# Patient Record
Sex: MALE | Race: ASIAN | NOT HISPANIC OR LATINO | URBAN - METROPOLITAN AREA
[De-identification: names, ages, dates, MRNs, and addresses within clinical notes are randomized per-mention and may not be internally consistent; named-entity substitution may affect disease eponyms.]

---

## 2023-08-17 ENCOUNTER — OUTPATIENT (OUTPATIENT)
Dept: EMERGENCY DEPT | Facility: HOSPITAL | Age: 88
LOS: 1 days | End: 2023-08-17
Payer: COMMERCIAL

## 2023-08-17 VITALS
OXYGEN SATURATION: 97 % | WEIGHT: 127.87 LBS | SYSTOLIC BLOOD PRESSURE: 152 MMHG | TEMPERATURE: 98 F | RESPIRATION RATE: 17 BRPM | DIASTOLIC BLOOD PRESSURE: 76 MMHG | HEART RATE: 56 BPM | HEIGHT: 69 IN

## 2023-08-17 DIAGNOSIS — R00.1 BRADYCARDIA, UNSPECIFIED: ICD-10-CM

## 2023-08-17 LAB
ALBUMIN SERPL ELPH-MCNC: 4.5 G/DL — SIGNIFICANT CHANGE UP (ref 3.3–5)
ALP SERPL-CCNC: 93 U/L — SIGNIFICANT CHANGE UP (ref 40–120)
ALT FLD-CCNC: 23 U/L — SIGNIFICANT CHANGE UP (ref 10–45)
ANION GAP SERPL CALC-SCNC: 14 MMOL/L — SIGNIFICANT CHANGE UP (ref 5–17)
APTT BLD: 33.2 SEC — SIGNIFICANT CHANGE UP (ref 24.5–35.6)
AST SERPL-CCNC: 29 U/L — SIGNIFICANT CHANGE UP (ref 10–40)
BASOPHILS # BLD AUTO: 0.05 K/UL — SIGNIFICANT CHANGE UP (ref 0–0.2)
BASOPHILS NFR BLD AUTO: 0.7 % — SIGNIFICANT CHANGE UP (ref 0–2)
BILIRUB SERPL-MCNC: 0.7 MG/DL — SIGNIFICANT CHANGE UP (ref 0.2–1.2)
BUN SERPL-MCNC: 31 MG/DL — HIGH (ref 7–23)
CALCIUM SERPL-MCNC: 10 MG/DL — SIGNIFICANT CHANGE UP (ref 8.4–10.5)
CHLORIDE SERPL-SCNC: 97 MMOL/L — SIGNIFICANT CHANGE UP (ref 96–108)
CO2 SERPL-SCNC: 23 MMOL/L — SIGNIFICANT CHANGE UP (ref 22–31)
CREAT SERPL-MCNC: 1.35 MG/DL — HIGH (ref 0.5–1.3)
EGFR: 50 ML/MIN/1.73M2 — LOW
EOSINOPHIL # BLD AUTO: 0.24 K/UL — SIGNIFICANT CHANGE UP (ref 0–0.5)
EOSINOPHIL NFR BLD AUTO: 3.6 % — SIGNIFICANT CHANGE UP (ref 0–6)
GLUCOSE SERPL-MCNC: 290 MG/DL — HIGH (ref 70–99)
HCT VFR BLD CALC: 46.3 % — SIGNIFICANT CHANGE UP (ref 39–50)
HGB BLD-MCNC: 15.2 G/DL — SIGNIFICANT CHANGE UP (ref 13–17)
IMM GRANULOCYTES NFR BLD AUTO: 0.3 % — SIGNIFICANT CHANGE UP (ref 0–0.9)
INR BLD: 1.04 RATIO — SIGNIFICANT CHANGE UP (ref 0.85–1.18)
LYMPHOCYTES # BLD AUTO: 2.23 K/UL — SIGNIFICANT CHANGE UP (ref 1–3.3)
LYMPHOCYTES # BLD AUTO: 33.4 % — SIGNIFICANT CHANGE UP (ref 13–44)
MAGNESIUM SERPL-MCNC: 2.2 MG/DL — SIGNIFICANT CHANGE UP (ref 1.6–2.6)
MCHC RBC-ENTMCNC: 29.7 PG — SIGNIFICANT CHANGE UP (ref 27–34)
MCHC RBC-ENTMCNC: 32.8 GM/DL — SIGNIFICANT CHANGE UP (ref 32–36)
MCV RBC AUTO: 90.6 FL — SIGNIFICANT CHANGE UP (ref 80–100)
MONOCYTES # BLD AUTO: 0.53 K/UL — SIGNIFICANT CHANGE UP (ref 0–0.9)
MONOCYTES NFR BLD AUTO: 7.9 % — SIGNIFICANT CHANGE UP (ref 2–14)
NEUTROPHILS # BLD AUTO: 3.6 K/UL — SIGNIFICANT CHANGE UP (ref 1.8–7.4)
NEUTROPHILS NFR BLD AUTO: 54.1 % — SIGNIFICANT CHANGE UP (ref 43–77)
NRBC # BLD: 0 /100 WBCS — SIGNIFICANT CHANGE UP (ref 0–0)
NT-PROBNP SERPL-SCNC: <36 PG/ML — SIGNIFICANT CHANGE UP (ref 0–300)
PLATELET # BLD AUTO: 114 K/UL — LOW (ref 150–400)
POTASSIUM SERPL-MCNC: 5 MMOL/L — SIGNIFICANT CHANGE UP (ref 3.5–5.3)
POTASSIUM SERPL-SCNC: 5 MMOL/L — SIGNIFICANT CHANGE UP (ref 3.5–5.3)
PROT SERPL-MCNC: 8.4 G/DL — HIGH (ref 6–8.3)
PROTHROM AB SERPL-ACNC: 10.9 SEC — SIGNIFICANT CHANGE UP (ref 9.5–13)
RBC # BLD: 5.11 M/UL — SIGNIFICANT CHANGE UP (ref 4.2–5.8)
RBC # FLD: 13.8 % — SIGNIFICANT CHANGE UP (ref 10.3–14.5)
SODIUM SERPL-SCNC: 134 MMOL/L — LOW (ref 135–145)
TROPONIN T, HIGH SENSITIVITY RESULT: 14 NG/L — SIGNIFICANT CHANGE UP (ref 0–51)
WBC # BLD: 6.67 K/UL — SIGNIFICANT CHANGE UP (ref 3.8–10.5)
WBC # FLD AUTO: 6.67 K/UL — SIGNIFICANT CHANGE UP (ref 3.8–10.5)

## 2023-08-17 NOTE — ED PROVIDER NOTE - NSICDXPASTMEDICALHX_GEN_ALL_CORE_FT
PAST MEDICAL HISTORY:  Benign Essential Hypertension     DU (Duodenal Ulcer)     PUD (Peptic Ulcer Disease)     Type II Diabetes Mellitus

## 2023-08-17 NOTE — ED ADULT NURSE NOTE - NSFALLUNIVINTERV_ED_ALL_ED
Bed/Stretcher in lowest position, wheels locked, appropriate side rails in place/Call bell, personal items and telephone in reach/Instruct patient to call for assistance before getting out of bed/chair/stretcher/Non-slip footwear applied when patient is off stretcher/[place] to call system/Physically safe environment - no spills, clutter or unnecessary equipment/Purposeful proactive rounding/Room/bathroom lighting operational, light cord in reach

## 2023-08-17 NOTE — ED ADULT NURSE NOTE - OBJECTIVE STATEMENT
Patient is a 89 year old male being sent here for admission for pacemaker placement. Patient reports being admitted from 8/14 until today to [facility] for syncope found to have Mobitz type I AV block.  Patient with persistent symptomatic bradycardia while in the hospital with TTE performed.  Case was discussed with cardiology at [facility] who recommended transfer but given delay secondary to diversion family opted to sign patient out AMA and bring to the emergency department themselves. On assessment A&Ox4, breathing comfortably, no accessory muscle use, no cough, chest rise and fall equal, on room air, sinus kaylie on the cardiac monitor, no JVD, no edema noted, abdomen is soft, nondistended, nontender, skin warm. PMH diabetes, hypertension, hyperlipidemia.

## 2023-08-17 NOTE — ED PROVIDER NOTE - RAPID ASSESSMENT
89-year-old male with past medical history of insulin-dependent diabetes, hypertension, hyperlipidemia presents emergency department for admission for pacemaker placement.  Patient was admitted from 8/14 until today to [facility] for syncope found to have Mobitz type I AV block.  Patient with persistent symptomatic bradycardia while in the hospital with TTE performed.  Case was discussed with cardiology at [facility] who recommended transfer but given delay secondary to diversion family opted to sign patient out AMA and bring to the emergency department themselves.  Patient has felt well and denies chest pain, shortness of breath, dizziness.  Per notes provided by patient's daughter Dr. Mehrdad Mcmahon should be aware of patient from cardiology.    Rapid assessment by Rosenda Martini PA-C full eval to be performed in ED

## 2023-08-17 NOTE — ED PROVIDER NOTE - CLINICAL SUMMARY MEDICAL DECISION MAKING FREE TEXT BOX
Transferred from [facility] for admission for placement of cardiac pacemaker. Symptomatic bradycardia. Pt asx and HR normal here, ekg shows heart block. Cards consulted, will admit to medicine on tele.

## 2023-08-17 NOTE — ED ADULT NURSE NOTE - ISOLATION TYPE:
routine visit. On arrival patient laying in bed a&o x4. Patient denied pain. Per patient apetite is good. Voiding without difficulty. Last bm=5/8/22. Wound care completed. Wound still closed. incontinence care administered and brief changed.   No other needs None

## 2023-08-17 NOTE — ED PROVIDER NOTE - ATTENDING CONTRIBUTION TO CARE
Patient is a 89-year-old male with history of type 2 diabetes, hypertension, hyperlipidemia, CKD, dementia here with his daughter-in-law for concern of symptomatic bradycardia.   history is being provided by patient's daughter-in-law.  Accordingly, patient had 2 episodes of syncope while at a restaurant on Monday.  He was taken to [facility].  Per daughter-in-law, he was told he needs a pacemaker.  Patient was to be transferred but family signed out AMA.  Case was discussed with Dr. Mcmahon who should be aware of patient.  At the time of my evaluation, patient denies any chest pain, shortness of breath, palpitations, dizziness.  No recent illnesses.  Denies fevers, chills, nausea, vomiting.    VS noted  Gen. no acute distress, Non toxic   HEENT: EOMI, mmm  Lungs: CTAB/L no C/ W /R   CVS: RRR   Abd; Soft non tender, non distended   Ext: no edema  Skin: no rash  Neuro AAOx3 non focal clear speech  a/p: syncope, hx of bradycardia. EKG with 1st degree AV block. Per paperwork with patient, cardiology was consulted and recommended transfer to [facility] for Micras PPM. NS was on diversion, so patient left AMA and came here directly.   - Jim HAM Patient is a 89-year-old male with history of type 2 diabetes, hypertension, hyperlipidemia, CKD, dementia here with his daughter-in-law for concern of symptomatic bradycardia.   history is being provided by patient's daughter-in-law.  Accordingly, patient had 2 episodes of syncope while at a restaurant on Monday.  He was taken to [facility].  Per daughter-in-law, he was told he needs a pacemaker.  Patient was to be transferred but family signed out AMA.  Case was discussed with Dr. Mcmahon who should be aware of patient.  At the time of my evaluation, patient denies any chest pain, shortness of breath, palpitations, dizziness.  No recent illnesses.  Denies fevers, chills, nausea, vomiting.    VS noted  Gen. no acute distress, Non toxic   HEENT: EOMI, mmm  Lungs: CTAB/L no C/ W /R   CVS: RRR   Abd; Soft non tender, non distended   Ext: no edema  Skin: no rash  Neuro AAOx3 non focal clear speech  a/p: syncope, hx of bradycardia. EKG with 1st degree AV block. Per paperwork with patient, cardiology was consulted and recommended transfer to [facility] for Micras PPM. NS was on diversion, so patient left AMA and came here directly. Will consult with cardiology and admit patient to tele.   Roger Fernandez MD

## 2023-08-17 NOTE — ED ADULT NURSE NOTE - CHIEF COMPLAINT QUOTE
pt was seen at [facility] and was found to have low heart rate and told he needed pacemaker  D/C from [facility] and came here for pacemaker  pt denies cp, sob, lightheadedness/weakness

## 2023-08-17 NOTE — CONSULT NOTE ADULT - ATTENDING COMMENTS
This patient has both AV conduction issues as well as vasovagal syncope and will be better served with a dual pacemaker instead of a Micra since he will need good AV synchrony and atrial Ventricular sequential pacing

## 2023-08-17 NOTE — CONSULT NOTE ADULT - ASSESSMENT
89M with PMH of DM, HTN, HLD presenting as a transfer from [facility] for syncope and concern for symptomatic bradycardia. No evidence for structural heart disease on TTE, 1st degree AV block on EKG (MA interval 330). DDx for syncope includes delayed AV conduction (supra-hisian) vs carotid hypersensitivity. Currently asymptomatic with HR in 60s, sinus rhythm.     Plan:  -monitor on telemetry overnight  -check TSH, no AV richard blockade  -NPO at [place] for potential PPM placement    Note not final until signed by attending

## 2023-08-17 NOTE — CONSULT NOTE ADULT - SUBJECTIVE AND OBJECTIVE BOX
Ted Tran MD  Cardiology Fellow  [phone]  All Cardiology service information can be found 24/7 on [email or web address], password: [email or web address]    Patient seen and evaluated at bedside    Chief Complaint: syncope    HPI:  89M with PMH of DM, HTN, HLD presenting as a transfer from [facility] for syncope and concern for symptomatic bradycardia. Patient initially presented on 8/14 to [place] due to a syncopal episode that occurred while eating, patient passed out in his chair, and was brought to the hospital. Prior to this, patient had another syncopal episode 2 months ago for which he did not seek medical care. At [place], TTE was obtained which reportedly showed EF 70%, mild diastolic dysfunction. Per family, patient was asymptomatic while in the hospital, and had heart rates 40s to 60s. Per discharge paperwork, patient was also noted to be in Mobitz type I.     Patient currently asymptomatic, [place] speaking, history obtained with  and from family. ROS positive for some chills but otherwise negative, no chest pain.     Cardiac Meds: amlodipine 5, atorvastatin 20, valsartan 40    PMHx:   Type II Diabetes Mellitus    Benign Essential Hypertension    PUD (Peptic Ulcer Disease)    DU (Duodenal Ulcer)    Allergies:  No Known Allergies    REVIEW OF SYSTEMS:  CONSTITUTIONAL: No weakness, fevers or chills  EYES/ENT: No visual changes;  No dysphagia  NECK: No pain or stiffness  RESPIRATORY: No cough, wheezing, hemoptysis; No shortness of breath  CARDIOVASCULAR: No chest pain or palpitations; No lower extremity edema  GASTROINTESTINAL: No abdominal or epigastric pain. No nausea, vomiting, or hematemesis; No diarrhea or constipation. No melena or hematochezia.  BACK: No back pain  GENITOURINARY: No dysuria, frequency or hematuria  NEUROLOGICAL: No numbness or weakness  SKIN: No itching, burning, rashes, or lesions   All other review of systems is negative unless indicated above.    Physical Exam:  T(F): 98.2 (08-17), Max: 98.2 (08-17)  HR: 63 (08-17) (56 - 63)  BP: 156/72 (08-17) (152/76 - 156/72)  RR: 18 (08-17)  SpO2: 99% (08-17)  GENERAL: No acute distress, well-developed  HEAD:  Atraumatic, Normocephalic  ENT: EOMI, conjunctiva and sclera clear, Neck supple, No JVD, moist mucosa  CHEST/LUNG: Clear to auscultation bilaterally; No wheeze, equal breath sounds bilaterally   HEART: Regular rate and rhythm; No murmurs, rubs, or gallops  ABDOMEN: Soft, Nontender, Nondistended  EXTREMITIES:  No clubbing, cyanosis, or edema  PSYCH: Nl behavior, nl affect  NEUROLOGY: AAOx3, non-focal, cranial nerves intact  SKIN: Normal color, No rashes or lesions    CXR: Personally reviewed    Labs: Personally reviewed                        15.2   6.67  )-----------( 114      ( 17 Aug 2023 18:35 )             46.3     08-17    134<L>  |  97  |  31<H>  ----------------------------<  290<H>  5.0   |  23  |  1.35<H>    Ca    10.0      17 Aug 2023 18:35  Mg     2.2     08-17    TPro  8.4<H>  /  Alb  4.5  /  TBili  0.7  /  DBili  x   /  AST  29  /  ALT  23  /  AlkPhos  93  08-17    PT/INR - ( 17 Aug 2023 18:35 )   PT: 10.9 sec;   INR: 1.04 ratio         PTT - ( 17 Aug 2023 18:35 )  PTT:33.2 sec    CARDIAC MARKERS ( 17 Aug 2023 18:35 )  14 ng/L / x     / x     / x     / x     / x

## 2023-08-17 NOTE — ED PROVIDER NOTE - OBJECTIVE STATEMENT
89M pmh HTN, HLD, T2DM, dementia presents from [facility] for admission for pacemaker placement. Pt was admitted to [facility] from 8/14 until today for symptomatic bradycardia. He had 2 episodes of syncope at a restaurant on monday that resolved within a few minutes and prompted activation of EMS by family. He was worked up at OSH and found to need a pacemaker placement for which he was transferred to [facility]. Per notes provided by patient's daughter Dr. Mehrdad Mcmahon from EP should be aware of this patient. Patient has felt well here, HR 50ws-60s and denies chest pain, shortness of breath, dizziness. History obtained from daughter in law at bedside.    PCP: Varghese Mata [phone] 89M pmh HTN, HLD, T2DM, CKD, dementia presents from [facility] for admission for pacemaker placement. Pt was admitted to [facility] from 8/14 until today for symptomatic bradycardia. He had 2 episodes of syncope at a restaurant on monday that resolved within a few minutes and prompted activation of EMS by family. He was worked up at OSH and found to need a pacemaker placement for which he was transferred to [facility]. Per notes provided by patient's daughter Dr. Mehrdad Mcmahon from EP should be aware of this patient. Patient has felt well here, HR 50ws-60s and denies chest pain, shortness of breath, dizziness. History obtained from daughter in law at bedside.    PCP: Varghese Mata [phone]

## 2023-08-17 NOTE — ED ADULT TRIAGE NOTE - CHIEF COMPLAINT QUOTE
pt was seen at [facility] and was found to have low heart rate and told he needed pacemaker  D/C from [facility] and came here for pacemaker  pt denies cp, sob, lightheadedness/weakness

## 2023-08-17 NOTE — ED PROVIDER NOTE - CARE PLAN
1 Principal Discharge DX:	Bradycardia  Assessment and plan of treatment:	Transferred from [facility] for admission for placement of cardiac pacemaker. Symptomatic bradycardia. Cards consulted, will admit to medicine on tele.

## 2023-08-17 NOTE — ED PROVIDER NOTE - PLAN OF CARE
Transferred from [facility] for admission for placement of cardiac pacemaker. Symptomatic bradycardia. Cards consulted, will admit to medicine on tele.

## 2023-08-18 ENCOUNTER — TRANSCRIPTION ENCOUNTER (OUTPATIENT)
Age: 88
End: 2023-08-18

## 2023-08-18 VITALS
OXYGEN SATURATION: 95 % | HEART RATE: 75 BPM | RESPIRATION RATE: 17 BRPM | TEMPERATURE: 97 F | DIASTOLIC BLOOD PRESSURE: 88 MMHG | SYSTOLIC BLOOD PRESSURE: 139 MMHG

## 2023-08-18 DIAGNOSIS — E11.9 TYPE 2 DIABETES MELLITUS WITHOUT COMPLICATIONS: ICD-10-CM

## 2023-08-18 DIAGNOSIS — E78.5 HYPERLIPIDEMIA, UNSPECIFIED: ICD-10-CM

## 2023-08-18 DIAGNOSIS — R55 SYNCOPE AND COLLAPSE: ICD-10-CM

## 2023-08-18 DIAGNOSIS — I44.30 UNSPECIFIED ATRIOVENTRICULAR BLOCK: ICD-10-CM

## 2023-08-18 DIAGNOSIS — I10 ESSENTIAL (PRIMARY) HYPERTENSION: ICD-10-CM

## 2023-08-18 DIAGNOSIS — I44.1 ATRIOVENTRICULAR BLOCK, SECOND DEGREE: ICD-10-CM

## 2023-08-18 DIAGNOSIS — Z29.9 ENCOUNTER FOR PROPHYLACTIC MEASURES, UNSPECIFIED: ICD-10-CM

## 2023-08-18 LAB
A1C WITH ESTIMATED AVERAGE GLUCOSE RESULT: 9.5 % — HIGH (ref 4–5.6)
ALBUMIN SERPL ELPH-MCNC: 3.7 G/DL — SIGNIFICANT CHANGE UP (ref 3.3–5)
ALP SERPL-CCNC: 79 U/L — SIGNIFICANT CHANGE UP (ref 40–120)
ALT FLD-CCNC: 18 U/L — SIGNIFICANT CHANGE UP (ref 10–45)
ANION GAP SERPL CALC-SCNC: 14 MMOL/L — SIGNIFICANT CHANGE UP (ref 5–17)
AST SERPL-CCNC: 23 U/L — SIGNIFICANT CHANGE UP (ref 10–40)
BILIRUB SERPL-MCNC: 0.6 MG/DL — SIGNIFICANT CHANGE UP (ref 0.2–1.2)
BUN SERPL-MCNC: 26 MG/DL — HIGH (ref 7–23)
CALCIUM SERPL-MCNC: 9 MG/DL — SIGNIFICANT CHANGE UP (ref 8.4–10.5)
CHLORIDE SERPL-SCNC: 100 MMOL/L — SIGNIFICANT CHANGE UP (ref 96–108)
CO2 SERPL-SCNC: 21 MMOL/L — LOW (ref 22–31)
CREAT SERPL-MCNC: 1.17 MG/DL — SIGNIFICANT CHANGE UP (ref 0.5–1.3)
EGFR: 59 ML/MIN/1.73M2 — LOW
ESTIMATED AVERAGE GLUCOSE: 226 MG/DL — HIGH (ref 68–114)
GLUCOSE BLDC GLUCOMTR-MCNC: 218 MG/DL — HIGH (ref 70–99)
GLUCOSE BLDC GLUCOMTR-MCNC: 249 MG/DL — HIGH (ref 70–99)
GLUCOSE BLDC GLUCOMTR-MCNC: 254 MG/DL — HIGH (ref 70–99)
GLUCOSE BLDC GLUCOMTR-MCNC: 258 MG/DL — HIGH (ref 70–99)
GLUCOSE SERPL-MCNC: 330 MG/DL — HIGH (ref 70–99)
HCT VFR BLD CALC: 41 % — SIGNIFICANT CHANGE UP (ref 39–50)
HGB BLD-MCNC: 13.7 G/DL — SIGNIFICANT CHANGE UP (ref 13–17)
MAGNESIUM SERPL-MCNC: 2.1 MG/DL — SIGNIFICANT CHANGE UP (ref 1.6–2.6)
MCHC RBC-ENTMCNC: 30.1 PG — SIGNIFICANT CHANGE UP (ref 27–34)
MCHC RBC-ENTMCNC: 33.4 GM/DL — SIGNIFICANT CHANGE UP (ref 32–36)
MCV RBC AUTO: 90.1 FL — SIGNIFICANT CHANGE UP (ref 80–100)
NRBC # BLD: 0 /100 WBCS — SIGNIFICANT CHANGE UP (ref 0–0)
PHOSPHATE SERPL-MCNC: 3.4 MG/DL — SIGNIFICANT CHANGE UP (ref 2.5–4.5)
PLATELET # BLD AUTO: 92 K/UL — LOW (ref 150–400)
POTASSIUM SERPL-MCNC: 4.6 MMOL/L — SIGNIFICANT CHANGE UP (ref 3.5–5.3)
POTASSIUM SERPL-SCNC: 4.6 MMOL/L — SIGNIFICANT CHANGE UP (ref 3.5–5.3)
PROT SERPL-MCNC: 7.1 G/DL — SIGNIFICANT CHANGE UP (ref 6–8.3)
RBC # BLD: 4.55 M/UL — SIGNIFICANT CHANGE UP (ref 4.2–5.8)
RBC # FLD: 13.5 % — SIGNIFICANT CHANGE UP (ref 10.3–14.5)
SODIUM SERPL-SCNC: 135 MMOL/L — SIGNIFICANT CHANGE UP (ref 135–145)
T4 FREE SERPL-MCNC: 1.4 NG/DL — SIGNIFICANT CHANGE UP (ref 0.9–1.8)
TSH SERPL-MCNC: 4.35 UIU/ML — HIGH (ref 0.27–4.2)
WBC # BLD: 5.43 K/UL — SIGNIFICANT CHANGE UP (ref 3.8–10.5)
WBC # FLD AUTO: 5.43 K/UL — SIGNIFICANT CHANGE UP (ref 3.8–10.5)

## 2023-08-18 RX ORDER — ATORVASTATIN CALCIUM 80 MG/1
20 TABLET, FILM COATED ORAL AT BEDTIME
Refills: 0 | Status: DISCONTINUED | OUTPATIENT
Start: 2023-08-18 | End: 2023-08-18

## 2023-08-18 RX ORDER — GLUCAGON INJECTION, SOLUTION 0.5 MG/.1ML
1 INJECTION, SOLUTION SUBCUTANEOUS ONCE
Refills: 0 | Status: DISCONTINUED | OUTPATIENT
Start: 2023-08-18 | End: 2023-08-18

## 2023-08-18 RX ORDER — HEPARIN SODIUM 5000 [USP'U]/ML
5000 INJECTION INTRAVENOUS; SUBCUTANEOUS EVERY 8 HOURS
Refills: 0 | Status: DISCONTINUED | OUTPATIENT
Start: 2023-08-18 | End: 2023-08-18

## 2023-08-18 RX ORDER — DEXTROSE 50 % IN WATER 50 %
25 SYRINGE (ML) INTRAVENOUS ONCE
Refills: 0 | Status: DISCONTINUED | OUTPATIENT
Start: 2023-08-18 | End: 2023-08-18

## 2023-08-18 RX ORDER — METOPROLOL TARTRATE 50 MG
1 TABLET ORAL
Qty: 30 | Refills: 0
Start: 2023-08-18 | End: 2023-09-16

## 2023-08-18 RX ORDER — ACETAMINOPHEN 500 MG
650 TABLET ORAL EVERY 6 HOURS
Refills: 0 | Status: DISCONTINUED | OUTPATIENT
Start: 2023-08-18 | End: 2023-08-18

## 2023-08-18 RX ORDER — INSULIN LISPRO 100/ML
VIAL (ML) SUBCUTANEOUS AT BEDTIME
Refills: 0 | Status: DISCONTINUED | OUTPATIENT
Start: 2023-08-18 | End: 2023-08-18

## 2023-08-18 RX ORDER — INSULIN LISPRO 100/ML
VIAL (ML) SUBCUTANEOUS
Refills: 0 | Status: DISCONTINUED | OUTPATIENT
Start: 2023-08-18 | End: 2023-08-18

## 2023-08-18 RX ORDER — AMLODIPINE BESYLATE 2.5 MG/1
5 TABLET ORAL DAILY
Refills: 0 | Status: DISCONTINUED | OUTPATIENT
Start: 2023-08-18 | End: 2023-08-18

## 2023-08-18 RX ORDER — INSULIN GLARGINE 100 [IU]/ML
20 INJECTION, SOLUTION SUBCUTANEOUS AT BEDTIME
Refills: 0 | Status: DISCONTINUED | OUTPATIENT
Start: 2023-08-18 | End: 2023-08-18

## 2023-08-18 RX ORDER — DEXTROSE 50 % IN WATER 50 %
15 SYRINGE (ML) INTRAVENOUS ONCE
Refills: 0 | Status: DISCONTINUED | OUTPATIENT
Start: 2023-08-18 | End: 2023-08-18

## 2023-08-18 RX ORDER — SODIUM CHLORIDE 9 MG/ML
500 INJECTION, SOLUTION INTRAVENOUS
Refills: 0 | Status: DISCONTINUED | OUTPATIENT
Start: 2023-08-18 | End: 2023-08-18

## 2023-08-18 RX ORDER — HUMAN INSULIN 100 [IU]/ML
8 INJECTION, SUSPENSION SUBCUTANEOUS ONCE
Refills: 0 | Status: DISCONTINUED | OUTPATIENT
Start: 2023-08-18 | End: 2023-08-18

## 2023-08-18 RX ORDER — INSULIN GLARGINE 100 [IU]/ML
20 INJECTION, SOLUTION SUBCUTANEOUS EVERY MORNING
Refills: 0 | Status: DISCONTINUED | OUTPATIENT
Start: 2023-08-18 | End: 2023-08-18

## 2023-08-18 RX ORDER — INSULIN LISPRO 100/ML
VIAL (ML) SUBCUTANEOUS EVERY 6 HOURS
Refills: 0 | Status: DISCONTINUED | OUTPATIENT
Start: 2023-08-18 | End: 2023-08-18

## 2023-08-18 RX ORDER — SODIUM CHLORIDE 9 MG/ML
1000 INJECTION, SOLUTION INTRAVENOUS
Refills: 0 | Status: DISCONTINUED | OUTPATIENT
Start: 2023-08-18 | End: 2023-08-18

## 2023-08-18 RX ORDER — DEXTROSE 50 % IN WATER 50 %
12.5 SYRINGE (ML) INTRAVENOUS ONCE
Refills: 0 | Status: DISCONTINUED | OUTPATIENT
Start: 2023-08-18 | End: 2023-08-18

## 2023-08-18 RX ORDER — METOPROLOL TARTRATE 50 MG
50 TABLET ORAL DAILY
Refills: 0 | Status: DISCONTINUED | OUTPATIENT
Start: 2023-08-18 | End: 2023-08-18

## 2023-08-18 RX ORDER — LANOLIN ALCOHOL/MO/W.PET/CERES
3 CREAM (GRAM) TOPICAL AT BEDTIME
Refills: 0 | Status: DISCONTINUED | OUTPATIENT
Start: 2023-08-18 | End: 2023-08-18

## 2023-08-18 RX ORDER — ONDANSETRON 8 MG/1
4 TABLET, FILM COATED ORAL EVERY 8 HOURS
Refills: 0 | Status: DISCONTINUED | OUTPATIENT
Start: 2023-08-18 | End: 2023-08-18

## 2023-08-18 RX ADMIN — AMLODIPINE BESYLATE 5 MILLIGRAM(S): 2.5 TABLET ORAL at 05:23

## 2023-08-18 RX ADMIN — Medication 50 MILLIGRAM(S): at 16:45

## 2023-08-18 RX ADMIN — Medication 3: at 07:58

## 2023-08-18 RX ADMIN — Medication 2: at 14:41

## 2023-08-18 RX ADMIN — Medication 2: at 16:08

## 2023-08-18 RX ADMIN — SODIUM CHLORIDE 50 MILLILITER(S): 9 INJECTION, SOLUTION INTRAVENOUS at 05:23

## 2023-08-18 RX ADMIN — HEPARIN SODIUM 5000 UNIT(S): 5000 INJECTION INTRAVENOUS; SUBCUTANEOUS at 05:23

## 2023-08-18 NOTE — PROGRESS NOTE ADULT - NS ATTEND AMEND GEN_ALL_CORE FT
This patient has evidence of AV conduction and also vasovagal syncope. With two episodes of syncope, a dual pacer with rate drop would be ideal. He cannot be treated with a beta blocker without a pacemaker. He needs atrial pacing. LVEF was normal Patient and family in agreement

## 2023-08-18 NOTE — H&P ADULT - PROBLEM SELECTOR PLAN 6
DVT prophy: SCD given possible procedure  Diet: NPO for procedure DVT prophy: heparin 5000U TID  Diet: NPO for possible procedure, pending EP recs

## 2023-08-18 NOTE — H&P ADULT - ASSESSMENT
89 yo M with history of syncope, heart block, insulin dependent DM2, HTN, HLD presenting after syncope and from [facility] for potential pacemaker given Mobitz type 1 heart block seen on EKG there.  89 M with insulin dependent DM2, HTN, HLD, presenting after hospitalization from [place] due to presyncopal episode and possible Mobitz type 1 heart block, found to have 1st degree heart block on EKG here, currently asymptomatic.   89 M with insulin dependent DM2, HTN, HLD, recent hospitalization @ OSH for presyncopal episode and possible Mobitz type 1 heart block, found to have mild bradycardia and 1st degree heart block on EKG here, currently asymptomatic.

## 2023-08-18 NOTE — DISCHARGE NOTE NURSING/CASE MANAGEMENT/SOCIAL WORK - NSDCPEFALRISK_GEN_ALL_CORE
For information on Fall & Injury Prevention, visit: [email or web address] OR  [email or web address] OR  https://www.cdc.gov/steadi/patient.html

## 2023-08-18 NOTE — PROGRESS NOTE ADULT - PROBLEM SELECTOR PLAN 4
Patient's Cr is 1.35, unclear if this is his baseline.   -hold home valsartan 40mg for now given mild creatinine elevation  -continue home amlodipine 5mg  - Trend BUN/Cr

## 2023-08-18 NOTE — PROGRESS NOTE ADULT - PROBLEM SELECTOR PLAN 2
kaylie to 50s. Briefly to HR 40s overnight.  Found to have questionable Wenkebach at [place], but no EKG to confirm. Here, seen to have first degree AV block, EKG in 2011 also with first degree AV block. On tele pt was in 50s and above, briefly in 40s while sleeping. Not on any AV richard blockers.   - EP following, appreciate recs: potential dual chamber PPM placement 8/18  - tele monitoring as above kaylie to 50s. Briefly to HR 40s overnight.  Found to have questionable Wenkebach at [place], but no EKG to confirm. Here, seen to have first degree AV block, EKG in 2011 also with first degree AV block. On tele pt was in 50s and above, briefly in 40s while sleeping. Not on any AV richard blockers.   - EP following, appreciate recs: dual chamber PPM placement 8/18  - tele monitoring as above

## 2023-08-18 NOTE — PRE-OP CHECKLIST - RESPIRATORY RATE (BREATHS/MIN)
Patient states thinks last time urinated between 2000 and 2130 last night, states woke up at about midnight and was unable to urinate. Patient states having pressure from not being able to urinate. Painful to palpation and distended bladder. Bladder scanned at 502.    18

## 2023-08-18 NOTE — DISCHARGE NOTE PROVIDER - CARE PROVIDER_API CALL
Wound Check at [facility] with ACP,   [address] [id number]  Phone: [phone]  Fax: (   )    -  Scheduled Appointment: 09/06/2023 03:40 PM   Wound Check at [facility] with ACP,   [address] [place]  [address]  Phone: [phone]  Fax: (   )    -  Scheduled Appointment: 09/06/2023 03:40 PM    [facility] at [place],   [facility] [place] [address]  Phone: [phone]  Fax: (   )    -  Follow Up Time:

## 2023-08-18 NOTE — PROVIDER CONTACT NOTE (OTHER) - ASSESSMENT
Pt is AxOx4, all VSS, no pain or SOB, no dizziness, pt asymptomatic.
Pt is AxOx4, all other VSS, /84, HR now 55. No chest pain, no dizziness or SOB. Pt admitted for bradycardia, pending PPM today

## 2023-08-18 NOTE — PROVIDER CONTACT NOTE (OTHER) - REASON
HR dropped to 43 on cardiac monitor, notified by tele tech
2.2 second pause HR 39 on cardiac monitor

## 2023-08-18 NOTE — PROGRESS NOTE ADULT - PROBLEM SELECTOR PLAN 3
Insulin dependent. Discharged from [facility] with 25U lantus, which is his home regimen. Previously also took novolog 20 at home, but was d/c'd at [facility]. Does not take meal time insulin.   - 20 U lantus ---> remains hyperglycemic 8/18; switched to 8 NPH 8/18  - KASIA Insulin dependent. Discharged from [facility] with 25U lantus, which is his home regimen. Previously also took novolog 20 at home, but was d/c'd at [facility]. Does not take meal time insulin.   - c/w 20 U lantus   - KASIA

## 2023-08-18 NOTE — DISCHARGE NOTE PROVIDER - PROVIDER TOKENS
FREE:[LAST:[Wound Check at [facility] with ACP],PHONE:[phone]],FAX:[(   )    -],ADDRESS:[[address] [place] [address]],SCHEDULEDAPPT:[09/06/2023],SCHEDULEDAPPTTIME:[03:40 PM]] FREE:[LAST:[Wound Check at [facility] with ACP],PHONE:[phone]],FAX:[(   )    -],ADDRESS:[[address] [place] [address]],SCHEDULEDAPPT:[09/06/2023],SCHEDULEDAPPTTIME:[03:40 PM]],FREE:[LAST:[[facility] at [place]],PHONE:[phone]],FAX:[(   )    -],ADDRESS:[[address] [place] [id number]]]

## 2023-08-18 NOTE — PROGRESS NOTE ADULT - ASSESSMENT
89 y.o. Male with PMH of DM, HTN, HLD presenting s/p recent hospitalization @ [facility] for dizzy and lightheaded episodes, no LOC.  On EKG/ tele noted to have NSR/ Sinus Bradycardia with 1st degree AV BLock, left anterior verito block rates 40's- 50's. Patient admits to ALBRIGHT.    Currently asymptomatic at rest in 1st degree heart block.      1.  Near Syncope with NSR/ Sinus Bradycardia with 1st degree AV Block, left anterior verito block rates 40's- 50's  also noted to have pauses <2.5 seconds on tele    - NPO for dual chamber pacemaker today  - follow up TSH, no AV richard blockade for now  - type and screen x 2 stat  - Hibiclens wash now  - Discontinued SQ Heparin for procedure.  Plan to avoid Heparin/ Lovenox post pacemaker  - Above case and plan discussed with patient's family member via Dr. Salome Marquez [facility]  [phone]  89 y.o. Male with PMH of DM, HTN, HLD presenting s/p recent hospitalization @ [facility] for dizzy and lightheaded episodes, no LOC.  On EKG/ tele noted to have NSR/ Sinus Bradycardia with 1st degree AV BLock, left anterior verito block rates 40's- 50's. Patient admits to ALBRIGHT.    Currently asymptomatic at rest in 1st degree heart block.  Patient has medical records at bedside from [facility] with reported TTE done during hospitalization with LVEF 70% and diastolic dysfunction.     1.  Near Syncope with NSR/ Sinus Bradycardia with 1st degree AV Block, left anterior verito block rates 40's- 50's  also noted to have pauses <2.5 seconds on tele    - NPO for dual chamber pacemaker today  - follow up TSH, no AV richard blockade for now  - type and screen x 2 stat  - Hibiclens wash now  - Discontinued SQ Heparin for procedure.  Plan to avoid Heparin/ Lovenox post pacemaker  - Above case and plan discussed with patient's family member via Dr. Salome Marquez [facility]  [phone]  89 y.o. Male with PMH of DM, HTN, HLD presenting s/p recent hospitalization @ [facility] for dizzy and lightheaded episodes, no LOC.  On EKG/ tele noted to have NSR/ Sinus Bradycardia with 1st degree AV BLock, left anterior verito block rates 40's- 50's. Patient admits to ALBRIGHT.    Currently asymptomatic at rest in 1st degree heart block.  Patient has medical records at bedside from [facility] with reported TTE done during hospitalization with LVEF 70% and diastolic dysfunction.     1.  Near Syncope with NSR/ Sinus Bradycardia with 1st degree AV Block, left anterior verito block rates 40's- 50's  also noted to have pauses <2.5 seconds on tele    - NPO for dual chamber pacemaker today  - follow up TSH, no AV richard blockade for now  - type and screen x 2 stat  - Hibiclens wash now  - post pacemaker teaching, activity and restrictions initiated via  phone  - Discontinued SQ Heparin for procedure.  Plan to avoid Heparin/ Lovenox post pacemaker  - Above case and plan discussed with patient's family member via Dr. Salome Marquez [facility]  [phone]     Addendum- patient is s/p dual chamber Medtronic PPM today. Tolerated procedure well. Post procedure activity and restrictions reviewed with patient's family daughter and wife at bedside. Booklet and remote monitor provided. Card will be mailed by [employer]. S/P device interrogation and pairing by [employer] Rep.  Follow up in EP Clinic on 9/6/23 at 3:40pm.     89 y.o. Male with PMH of DM, HTN, HLD presenting s/p recent hospitalization @ [facility] for dizzy and lightheaded episodes, no LOC.  On EKG/ tele noted to have NSR/ Sinus Bradycardia with 1st degree AV BLock, left anterior verito block rates 40's- 50's. Patient admits to ALBRIGHT.    Currently asymptomatic at rest in 1st degree heart block.  Patient has medical records at bedside from [facility] with reported TTE done during hospitalization with LVEF 70% and diastolic dysfunction.     1.  Near Syncope with NSR/ Sinus Bradycardia with 1st degree AV Block, left anterior verito block rates 40's- 50's  also noted to have pauses <2.5 seconds on tele    - NPO for dual chamber pacemaker today  - follow up TSH, no AV richard blockade for now  - type and screen x 2 stat  - Hibiclens wash now  - post pacemaker teaching, activity and restrictions initiated via  phone  - Discontinued SQ Heparin for procedure.  Plan to avoid Heparin/ Lovenox post pacemaker  - Above case and plan discussed with patient's family member via Dr. Salome Marquez [facility]  [phone]     Addendum- patient is s/p dual chamber Medtronic PPM today. Tolerated procedure well. Post procedure activity and restrictions reviewed with patient's family daughter and wife at bedside. Booklet and remote monitor provided. Card will be mailed by [employer]. S/P device interrogation and pairing by [employer] Rep.  Follow up in EP Clinic on 9/6/23 at 3:40pm.  CXray with good lead placement and no pneumothorax.

## 2023-08-18 NOTE — PROGRESS NOTE ADULT - ATTENDING COMMENTS
89M PMH IDDM2, HTN, HLD, with recent admission to [place] for syncope, now presents to [facility] for PPM after leaving that hospital AMA. Syncopal episode occurred after consuming GI herbal remedy which is known to be a stimulant patient reports this was a first time taking this medication for him.    #Syncope  - EP consulted and following, for possible PPM, follow up recommendations  - Echocardiogram ordered  - TSH, B12 ordered

## 2023-08-18 NOTE — PROGRESS NOTE ADULT - SUBJECTIVE AND OBJECTIVE BOX
24H hour events:     MEDICATIONS:  amLODIPine   Tablet 5 milliGRAM(s) Oral daily    acetaminophen     Tablet .. 650 milliGRAM(s) Oral every 6 hours PRN  melatonin 3 milliGRAM(s) Oral at bedtime PRN  ondansetron Injectable 4 milliGRAM(s) IV Push every 8 hours PRN    aluminum hydroxide/magnesium hydroxide/simethicone Suspension 30 milliLiter(s) Oral every 4 hours PRN    atorvastatin 20 milliGRAM(s) Oral at bedtime  dextrose 50% Injectable 25 Gram(s) IV Push once  dextrose 50% Injectable 12.5 Gram(s) IV Push once  dextrose 50% Injectable 25 Gram(s) IV Push once  dextrose Oral Gel 15 Gram(s) Oral once PRN  glucagon  Injectable 1 milliGRAM(s) IntraMuscular once  insulin glargine Injectable (LANTUS) 20 Unit(s) SubCutaneous every morning  insulin lispro (ADMELOG) corrective regimen sliding scale   SubCutaneous every 6 hours  insulin lispro (ADMELOG) corrective regimen sliding scale   SubCutaneous at bedtime    dextrose 5%. 1000 milliLiter(s) IV Continuous <Continuous>  dextrose 5%. 1000 milliLiter(s) IV Continuous <Continuous>  lactated ringers. 500 milliLiter(s) IV Continuous <Continuous>      REVIEW OF SYSTEMS:  Complete 12point ROS negative.    PHYSICAL EXAM:  T(C): 36.5 (08-18-23 @ 04:28), Max: 36.8 (08-17-23 @ 20:12)  HR: 69 (08-18-23 @ 04:28) (55 - 69)  BP: 122/74 (08-18-23 @ 04:28) (122/74 - 156/72)  RR: 18 (08-18-23 @ 04:28) (17 - 18)  SpO2: 97% (08-18-23 @ 04:28) (97% - 99%)  Wt(kg): --  I&O's Summary      Appearance: Normal	  HEENT:   Normal oral mucosa, PERRL, EOMI	  Cardiovascular: Normal S1 S2, No JVD, No murmurs, No edema  Respiratory: Lungs clear to auscultation	  Psychiatry: A & O x 3, Mood & affect appropriate  Gastrointestinal:  Soft, Non-tender, + BS	  Skin: No rashes, No ecchymoses, No cyanosis	  Neurologic: Non-focal  Extremities: Normal range of motion, No clubbing, cyanosis or edema  Vascular: Peripheral pulses palpable 2+ bilaterally        LABS:	 	    CBC Full  -  ( 18 Aug 2023 07:19 )  WBC Count : 5.43 K/uL  Hemoglobin : 13.7 g/dL  Hematocrit : 41.0 %  Platelet Count - Automated : 92 K/uL  Mean Cell Volume : 90.1 fl  Mean Cell Hemoglobin : 30.1 pg  Mean Cell Hemoglobin Concentration : 33.4 gm/dL  Auto Neutrophil # : x  Auto Lymphocyte # : x  Auto Monocyte # : x  Auto Eosinophil # : x  Auto Basophil # : x  Auto Neutrophil % : x  Auto Lymphocyte % : x  Auto Monocyte % : x  Auto Eosinophil % : x  Auto Basophil % : x    08-18    135  |  100  |  26<H>  ----------------------------<  330<H>  4.6   |  21<L>  |  1.17  08-17    134<L>  |  97  |  31<H>  ----------------------------<  290<H>  5.0   |  23  |  1.35<H>    Ca    9.0      18 Aug 2023 07:19  Ca    10.0      17 Aug 2023 18:35  Phos  3.4     08-18  Mg     2.1     08-18  Mg     2.2     08-17    TPro  7.1  /  Alb  3.7  /  TBili  0.6  /  DBili  x   /  AST  23  /  ALT  18  /  AlkPhos  79  08-18  TPro  8.4<H>  /  Alb  4.5  /  TBili  0.7  /  DBili  x   /  AST  29  /  ALT  23  /  AlkPhos  93  08-17      TSH: Thyroid Stimulating Hormone, Serum: 4.35 uIU/mL (08-18 @ 07:19)        TELEMETRY: 	          24H hour events: No over night events. Denies c/o CP, palpitations or SOB. Admits to [facility].     [facility] : [id number]    MEDICATIONS:  amLODIPine   Tablet 5 milliGRAM(s) Oral daily    acetaminophen     Tablet .. 650 milliGRAM(s) Oral every 6 hours PRN  melatonin 3 milliGRAM(s) Oral at bedtime PRN  ondansetron Injectable 4 milliGRAM(s) IV Push every 8 hours PRN    aluminum hydroxide/magnesium hydroxide/simethicone Suspension 30 milliLiter(s) Oral every 4 hours PRN    atorvastatin 20 milliGRAM(s) Oral at bedtime  dextrose 50% Injectable 25 Gram(s) IV Push once  dextrose 50% Injectable 12.5 Gram(s) IV Push once  dextrose 50% Injectable 25 Gram(s) IV Push once  dextrose Oral Gel 15 Gram(s) Oral once PRN  glucagon  Injectable 1 milliGRAM(s) IntraMuscular once  insulin glargine Injectable (LANTUS) 20 Unit(s) SubCutaneous every morning  insulin lispro (ADMELOG) corrective regimen sliding scale   SubCutaneous every 6 hours  insulin lispro (ADMELOG) corrective regimen sliding scale   SubCutaneous at bedtime    dextrose 5%. 1000 milliLiter(s) IV Continuous <Continuous>  dextrose 5%. 1000 milliLiter(s) IV Continuous <Continuous>  lactated ringers. 500 milliLiter(s) IV Continuous <Continuous>      REVIEW OF SYSTEMS:  Complete 12point ROS negative.    PHYSICAL EXAM:  T(C): 36.5 (08-18-23 @ 04:28), Max: 36.8 (08-17-23 @ 20:12)  HR: 69 (08-18-23 @ 04:28) (55 - 69)  BP: 122/74 (08-18-23 @ 04:28) (122/74 - 156/72)  RR: 18 (08-18-23 @ 04:28) (17 - 18)  SpO2: 97% (08-18-23 @ 04:28) (97% - 99%)      Appearance: Normal	  HEENT:   Normal oral mucosa, PERRL, EOMI	  Cardiovascular: Normal S1 S2, No JVD, No murmurs, No edema  Respiratory: Lungs clear to auscultation	  Psychiatry: A & O x 3, Mood & affect appropriate  Gastrointestinal:  Soft, Non-tender, + BS	  Skin: No rashes, No ecchymoses, No cyanosis	  Neurologic: Non-focal  Extremities: Normal range of motion, No clubbing, cyanosis or edema  Vascular: Peripheral pulses palpable 2+ bilaterally        LABS:	 	    CBC Full  -  ( 18 Aug 2023 07:19 )  WBC Count : 5.43 K/uL  Hemoglobin : 13.7 g/dL  Hematocrit : 41.0 %  Platelet Count - Automated : 92 K/uL  Mean Cell Volume : 90.1 fl  Mean Cell Hemoglobin : 30.1 pg  Mean Cell Hemoglobin Concentration : 33.4 gm/dL  Auto Neutrophil # : x  Auto Lymphocyte # : x  Auto Monocyte # : x  Auto Eosinophil # : x  Auto Basophil # : x  Auto Neutrophil % : x  Auto Lymphocyte % : x  Auto Monocyte % : x  Auto Eosinophil % : x  Auto Basophil % : x    08-18    135  |  100  |  26<H>  ----------------------------<  330<H>  4.6   |  21<L>  |  1.17  08-17    134<L>  |  97  |  31<H>  ----------------------------<  290<H>  5.0   |  23  |  1.35<H>    Ca    9.0      18 Aug 2023 07:19  Ca    10.0      17 Aug 2023 18:35  Phos  3.4     08-18  Mg     2.1     08-18  Mg     2.2     08-17    TPro  7.1  /  Alb  3.7  /  TBili  0.6  /  DBili  x   /  AST  23  /  ALT  18  /  AlkPhos  79  08-18  TPro  8.4<H>  /  Alb  4.5  /  TBili  0.7  /  DBili  x   /  AST  29  /  ALT  23  /  AlkPhos  93  08-17      TSH: Thyroid Stimulating Hormone, Serum: 4.35 uIU/mL (08-18 @ 07:19)        TELEMETRY: 	  Sinus Bradycardia/ NSR 40-60's 1st degree with pauses <2.5 seconds

## 2023-08-18 NOTE — H&P ADULT - HISTORY OF PRESENT ILLNESS
89 M with Mobitz type I AV block, insulin dependent DM2, HTN, HLD, CKD, dementia  presenting as a transfer from [place] for syncope and possible pacemaker placement. On 8/14 patient had 2 episodes of syncope at a restaurant resolved within a few minutes and was admitted at [place] and found to have Mobitz type I AV block.  Currently, patient *** 89 M with Mobitz type I AV block, insulin dependent DM2, HTN, HLD, CKD, dementia  presenting as a transfer from [place] for syncope and possible pacemaker placement. On 8/14 patient states he felt dizzy and lightheaded at a restaurant that resolved within a few minutes and his son and daughter in law took him to [place]. There he was found to have symptomatic bradycardia as well as a Mobitz type I AV block. He also underwent a TTE there which showed LV EF of 70% and diastolic dysfunction with normal mean LAP. Patient was planned for transfer to [facility] for PPM placement, however given ***, left AMA and was taken by his son to [facility].  Patient states he has not had chest pain or palpitations during this time period and that he has never felt dizzy or lightheaded in this way prior to the incident at the restaurant. He states that he has no chest pain now and is not SOB. EKG from 2011 with type I AV block.  89 M with Mobitz type I AV block, insulin dependent DM2, HTN, HLD, CKD, dementia  presenting as a transfer from [place] for syncope and possible pacemaker placement. On 8/14 patient states he felt dizzy and lightheaded at a restaurant that resolved within a few minutes and his son and daughter in law took him to [place]. There he was found to have symptomatic bradycardia as well as a Mobitz type I AV block. He also underwent a TTE there which showed LV EF of 70% and diastolic dysfunction with normal mean LAP. Patient was planned for transfer to [facility] for PPM placement, however given delay secondary to diversion, left AMA and was taken by his son to [facility].  Patient states he has not had chest pain or palpitations during this time period and that he has never felt dizzy or lightheaded in this way prior to the incident at the restaurant. He states that he has no chest pain now and is not SOB. EKG from 2011 with type I AV block.     In ED< patient had another EKG which showed type I AV block. Cardiology was consulted.  89 M with insulin dependent DM2, HTN, HLD, presenting after hospitalization from [place] due to presyncopal episode and possible Mobitz type 1 heart block. On 8/14 patient states he felt dizzy and lightheaded at a restaurant that resolved within a few minutes and his son and daughter in law took him to [place]. There he was found to have symptomatic bradycardia and was reported to have Mobitz type I AV block on an EKG, unable to confirm currently as we do not have the EKG. He also underwent a TTE there which showed LV EF of 70% and diastolic dysfunction with normal mean LAP. Patient was planned for transfer to [facility] for possible PPM placement, however given delay secondary to diversion, left AMA and was taken by his son to [facility].  Patient states he has not had chest pain or palpitations during this time period and that he has never felt dizzy or lightheaded in this way prior to the incident at the restaurant. He states that he has no chest pain now and is not SOB. Previous EKG from 2011 was seen with first degree AV block.     In ED, patient had another EKG which showed first degree AV block. EP was consulted.  89 M with insulin dependent DM2, HTN, HLD, presenting after hospitalization from [place] due to presyncopal episode and possible Mobitz type 1 heart block. On 8/14 patient states he felt dizzy and lightheaded at a restaurant that resolved within a few minutes and his son and daughter in law took him to [facility]. Patient denies loss of consciousness during this event. There he was found to have symptomatic bradycardia and was reported to have Mobitz type I AV block on an EKG, unable to confirm currently as we do not have the EKG. He also underwent a TTE there which showed LV EF of 70% and diastolic dysfunction with normal mean LAP. Patient was planned for transfer to [facility] for possible PPM placement, however given delay secondary to diversion, left AMA and was taken by his son to [facility].  Patient states he has not had chest pain or palpitations during this time period and that he has never felt dizzy or lightheaded in this way prior to the incident at the restaurant. He states that he has no chest pain now and is not SOB. Previous EKG from 2011 was seen with first degree AV block.     In ED, patient had another EKG which showed first degree AV block. EP was consulted.  Pt evaluated prior to midnight on 8/18/23.    89 M with insulin dependent DM2, HTN, HLD, presenting after hospitalization from [place] due to presyncopal episode and possible Mobitz type 1 heart block. On 8/14 patient states he felt dizzy and lightheaded at a restaurant that resolved within a few minutes and his son and daughter in law took him to [place]. Patient denies loss of consciousness during this event. There he was found to have symptomatic bradycardia and was reported to have Mobitz type I AV block on an EKG, unable to confirm currently as we do not have the EKG. He also underwent a TTE there which showed LV EF of 70% and diastolic dysfunction. Patient was planned for transfer to [facility] for possible PPM placement, however given delay secondary to diversion, left AMA and was taken by his son to [facility].  Patient states he has not had chest pain or palpitations during this time period and that he has never felt dizzy or lightheaded in this way prior to the incident at the restaurant. He states that he has no chest pain now and is not SOB. Previous EKG from 2011 was seen with first degree AV block.     In ED, patient had another EKG which showed first degree AV block. EP was consulted.

## 2023-08-18 NOTE — PROGRESS NOTE ADULT - SUBJECTIVE AND OBJECTIVE BOX
Patient is a 89y old  Male who presents with a chief complaint of presyncope (18 Aug 2023 08:30)    SUBJECTIVE / OVERNIGHT EVENTS: Patient chart reviewed. No overnight events.     MEDICATIONS  (STANDING):  amLODIPine   Tablet 5 milliGRAM(s) Oral daily  atorvastatin 20 milliGRAM(s) Oral at bedtime  dextrose 5%. 1000 milliLiter(s) (50 mL/Hr) IV Continuous <Continuous>  dextrose 5%. 1000 milliLiter(s) (100 mL/Hr) IV Continuous <Continuous>  dextrose 50% Injectable 25 Gram(s) IV Push once  dextrose 50% Injectable 12.5 Gram(s) IV Push once  dextrose 50% Injectable 25 Gram(s) IV Push once  glucagon  Injectable 1 milliGRAM(s) IntraMuscular once  insulin glargine Injectable (LANTUS) 20 Unit(s) SubCutaneous at bedtime  insulin lispro (ADMELOG) corrective regimen sliding scale   SubCutaneous every 6 hours  insulin lispro (ADMELOG) corrective regimen sliding scale   SubCutaneous at bedtime  insulin NPH human recombinant 8 Unit(s) SubCutaneous once  lactated ringers. 500 milliLiter(s) (50 mL/Hr) IV Continuous <Continuous>    MEDICATIONS  (PRN):  acetaminophen     Tablet .. 650 milliGRAM(s) Oral every 6 hours PRN Temp greater or equal to 38C (100.4F), Mild Pain (1 - 3)  aluminum hydroxide/magnesium hydroxide/simethicone Suspension 30 milliLiter(s) Oral every 4 hours PRN Dyspepsia  dextrose Oral Gel 15 Gram(s) Oral once PRN Blood Glucose LESS THAN 70 milliGRAM(s)/deciliter  melatonin 3 milliGRAM(s) Oral at bedtime PRN Insomnia  ondansetron Injectable 4 milliGRAM(s) IV Push every 8 hours PRN Nausea and/or Vomiting      CAPILLARY BLOOD GLUCOSE      POCT Blood Glucose.: 254 mg/dL (18 Aug 2023 10:44)  POCT Blood Glucose.: 258 mg/dL (18 Aug 2023 07:51)    I&O's Summary      Vital Signs Last 24 Hrs  T(C): 36.6 (18 Aug 2023 10:31), Max: 36.8 (17 Aug 2023 20:12)  T(F): 97.9 (18 Aug 2023 10:31), Max: 98.2 (17 Aug 2023 20:12)  HR: 69 (18 Aug 2023 04:28) (55 - 69)  BP: 149/75 (18 Aug 2023 10:31) (122/74 - 156/72)  BP(mean): --  RR: 18 (18 Aug 2023 10:31) (17 - 18)  SpO2: 95% (18 Aug 2023 10:31) (95% - 99%)    Parameters below as of 18 Aug 2023 04:28  Patient On (Oxygen Delivery Method): room air        PHYSICAL EXAM:  GENERAL: NAD, well-developed, well-nourished   HEAD: Atraumatic, Normocephalic  EYES: EOMI, PERRLA, conjunctiva and sclera clear  NECK: Supple, No JVD  CHEST/LUNG: Clear to auscultation bilaterally; No wheezes or crackles  HEART: Normal S1/S2; Regular rate and rhythm; No murmurs, rubs, or gallops  ABDOMEN: Soft, Nontender, Nondistended; Bowel sounds present  EXTREMITIES: 2+ Peripheral Pulses; No clubbing, cyanosis, or edema  PSYCH: A&Ox3  NEUROLOGY: no focal neurologic deficit  SKIN: No rashes or lesions    LABS:                        13.7   5.43  )-----------( 92       ( 18 Aug 2023 07:19 )             41.0      08-18    135  |  100  |  26<H>  ----------------------------<  330<H>  4.6   |  21<L>  |  1.17    Ca    9.0      18 Aug 2023 07:19  Phos  3.4     08-18  Mg     2.1     08-18    TPro  7.1  /  Alb  3.7  /  TBili  0.6  /  DBili  x   /  AST  23  /  ALT  18  /  AlkPhos  79  08-18    PT/INR - ( 17 Aug 2023 18:35 )   PT: 10.9 sec;   INR: 1.04 ratio         PTT - ( 17 Aug 2023 18:35 )  PTT:33.2 sec      Urinalysis Basic - ( 18 Aug 2023 07:19 )    Color: x / Appearance: x / SG: x / pH: x  Gluc: 330 mg/dL / Ketone: x  / Bili: x / Urobili: x   Blood: x / Protein: x / Nitrite: x   Leuk Esterase: x / RBC: x / WBC x   Sq Epi: x / Non Sq Epi: x / Bacteria: x        RADIOLOGY & ADDITIONAL TESTS:    Imaging Personally Reviewed:    Consultant(s) Notes Reviewed:      Care Discussed with Consultants/Other Providers:   Patient is a 89y old  Male who presents with a chief complaint of presyncope (18 Aug 2023 08:30)    SUBJECTIVE / OVERNIGHT EVENTS: Elli Ward ID [id number]. Patient was examined at bedside this morning. Appeared comfortable. Overnight vitals and monitoring results were reviewed. Reporting no complaints. Denied chest pain, SOB, abdominal pain, vomiting, diarrhea, constipation. Signed HIP[employer] medical form release 8/18.    MEDICATIONS  (STANDING):  amLODIPine   Tablet 5 milliGRAM(s) Oral daily  atorvastatin 20 milliGRAM(s) Oral at bedtime  dextrose 5%. 1000 milliLiter(s) (50 mL/Hr) IV Continuous <Continuous>  dextrose 5%. 1000 milliLiter(s) (100 mL/Hr) IV Continuous <Continuous>  dextrose 50% Injectable 25 Gram(s) IV Push once  dextrose 50% Injectable 12.5 Gram(s) IV Push once  dextrose 50% Injectable 25 Gram(s) IV Push once  glucagon  Injectable 1 milliGRAM(s) IntraMuscular once  insulin glargine Injectable (LANTUS) 20 Unit(s) SubCutaneous at bedtime  insulin lispro (ADMELOG) corrective regimen sliding scale   SubCutaneous every 6 hours  insulin lispro (ADMELOG) corrective regimen sliding scale   SubCutaneous at bedtime  insulin NPH human recombinant 8 Unit(s) SubCutaneous once  lactated ringers. 500 milliLiter(s) (50 mL/Hr) IV Continuous <Continuous>    MEDICATIONS  (PRN):  acetaminophen     Tablet .. 650 milliGRAM(s) Oral every 6 hours PRN Temp greater or equal to 38C (100.4F), Mild Pain (1 - 3)  aluminum hydroxide/magnesium hydroxide/simethicone Suspension 30 milliLiter(s) Oral every 4 hours PRN Dyspepsia  dextrose Oral Gel 15 Gram(s) Oral once PRN Blood Glucose LESS THAN 70 milliGRAM(s)/deciliter  melatonin 3 milliGRAM(s) Oral at bedtime PRN Insomnia  ondansetron Injectable 4 milliGRAM(s) IV Push every 8 hours PRN Nausea and/or Vomiting      CAPILLARY BLOOD GLUCOSE      POCT Blood Glucose.: 254 mg/dL (18 Aug 2023 10:44)  POCT Blood Glucose.: 258 mg/dL (18 Aug 2023 07:51)    I&O's Summary      Vital Signs Last 24 Hrs  T(C): 36.6 (18 Aug 2023 10:31), Max: 36.8 (17 Aug 2023 20:12)  T(F): 97.9 (18 Aug 2023 10:31), Max: 98.2 (17 Aug 2023 20:12)  HR: 69 (18 Aug 2023 04:28) (55 - 69)  BP: 149/75 (18 Aug 2023 10:31) (122/74 - 156/72)  BP(mean): --  RR: 18 (18 Aug 2023 10:31) (17 - 18)  SpO2: 95% (18 Aug 2023 10:31) (95% - 99%)    Parameters below as of 18 Aug 2023 04:28  Patient On (Oxygen Delivery Method): room air        PHYSICAL EXAM:  GENERAL: NAD, well-developed, well-nourished   HEAD: Atraumatic, Normocephalic  EYES: EOMI, PERRLA, conjunctiva and sclera clear  NECK: Supple, No JVD  CHEST/LUNG: Clear to auscultation bilaterally; No wheezes or crackles  HEART: Normal S1/S2; Regular rate and rhythm; No murmurs, rubs, or gallops  ABDOMEN: Soft, Nontender, Nondistended; Bowel sounds present  EXTREMITIES: 2+ Peripheral Pulses; No clubbing, cyanosis, or edema  PSYCH: A&Ox3  NEUROLOGY: no focal neurologic deficit  SKIN: No rashes or lesions    LABS:                        13.7   5.43  )-----------( 92       ( 18 Aug 2023 07:19 )             41.0      08-18    135  |  100  |  26<H>  ----------------------------<  330<H>  4.6   |  21<L>  |  1.17    Ca    9.0      18 Aug 2023 07:19  Phos  3.4     08-18  Mg     2.1     08-18    TPro  7.1  /  Alb  3.7  /  TBili  0.6  /  DBili  x   /  AST  23  /  ALT  18  /  AlkPhos  79  08-18    PT/INR - ( 17 Aug 2023 18:35 )   PT: 10.9 sec;   INR: 1.04 ratio         PTT - ( 17 Aug 2023 18:35 )  PTT:33.2 sec      Urinalysis Basic - ( 18 Aug 2023 07:19 )    Color: x / Appearance: x / SG: x / pH: x  Gluc: 330 mg/dL / Ketone: x  / Bili: x / Urobili: x   Blood: x / Protein: x / Nitrite: x   Leuk Esterase: x / RBC: x / WBC x   Sq Epi: x / Non Sq Epi: x / Bacteria: x        RADIOLOGY & ADDITIONAL TESTS:    Imaging Personally Reviewed:    Consultant(s) Notes Reviewed:      Care Discussed with Consultants/Other Providers:

## 2023-08-18 NOTE — PROVIDER CONTACT NOTE (OTHER) - BACKGROUND
Pt admitted with bradycardia, hx of Type 2 DM, PUD.
Pt is admitted with bradycardia. Pt has a history of HTN. Admitted with Mobitz type 1 Heart block

## 2023-08-18 NOTE — DISCHARGE NOTE PROVIDER - NSDCFUSCHEDAPPT_GEN_ALL_CORE_FT
[facility]  ELECTROPH [address]  Scheduled Appointment: 09/06/2023

## 2023-08-18 NOTE — DISCHARGE NOTE PROVIDER - NSDCCPCAREPLAN_GEN_ALL_CORE_FT
PRINCIPAL DISCHARGE DIAGNOSIS  Diagnosis: Pre-syncope  Assessment and Plan of Treatment: You were admitted to the hospital [facility] you were dizzy. Heart testing was done and ti showed an arrythmia we think caused the dizziness. The electrophysiologist heart doctors assessed you and recommended you have a pacemaker placed which was done 8/18. When the dizziness episode happened you were eating betel nut. We recommend you do not continue to eat this as it increases risk for head and neck cancers and can cause problems with your teeth among other health risks.   Please follow up with your primary care doctor.   If you start to experience chest pain, shortness of breath, abdominal pain, nausea, or vomiting please return to the emergency room.       PRINCIPAL DISCHARGE DIAGNOSIS  Diagnosis: Pre-syncope  Assessment and Plan of Treatment: You were admitted to the hospital [facility] you were dizzy. Heart testing was done and ti showed an arrythmia we think caused the dizziness. The electrophysiologist heart doctors assessed you and recommended you have a pacemaker placed which was done 8/18. You are also being started on a new medication metoprolol 50mg once a day; please take as directed.   When the dizziness episode happened you were eating betel nut. We recommend you do not continue to eat this as it increases risk for head and neck cancers and can cause problems with your teeth among other health risks.   Please follow up with your primary care doctor.   If you start to experience chest pain, shortness of breath, abdominal pain, nausea, or vomiting please return to the emergency room.

## 2023-08-18 NOTE — H&P ADULT - NSHPLABSRESULTS_GEN_ALL_CORE
LABS:                         15.2   6.67  )-----------( 114      ( 17 Aug 2023 18:35 )             46.3     08-17    134<L>  |  97  |  31<H>  ----------------------------<  290<H>  5.0   |  23  |  1.35<H>    Ca    10.0      17 Aug 2023 18:35  Mg     2.2     08-17    TPro  8.4<H>  /  Alb  4.5  /  TBili  0.7  /  DBili  x   /  AST  29  /  ALT  23  /  AlkPhos  93  08-17    PT/INR - ( 17 Aug 2023 18:35 )   PT: 10.9 sec;   INR: 1.04 ratio         PTT - ( 17 Aug 2023 18:35 )  PTT:33.2 sec  Urinalysis Basic - ( 17 Aug 2023 18:35 )    Color: x / Appearance: x / SG: x / pH: x  Gluc: 290 mg/dL / Ketone: x  / Bili: x / Urobili: x   Blood: x / Protein: x / Nitrite: x   Leuk Esterase: x / RBC: x / WBC x   Sq Epi: x / Non Sq Epi: x / Bacteria: x            RADIOLOGY, EKG & ADDITIONAL TESTS: Reviewed.     EKG reviewed by me: prolonged MN interval, type 1 AV block, regular rate. no ST elevations    CXR prelim: clear lungs Personally reviewed imaging, labs, ekg.    EKG reviewed by me: prolonged NV interval, type 1 AV block, regular rate. no ST elevations    CXR prelim: clear lungs    LABS:                         15.2   6.67  )-----------( 114      ( 17 Aug 2023 18:35 )             46.3     08-17    134<L>  |  97  |  31<H>  ----------------------------<  290<H>  5.0   |  23  |  1.35<H>    Ca    10.0      17 Aug 2023 18:35  Mg     2.2     08-17    TPro  8.4<H>  /  Alb  4.5  /  TBili  0.7  /  DBili  x   /  AST  29  /  ALT  23  /  AlkPhos  93  08-17    PT/INR - ( 17 Aug 2023 18:35 )   PT: 10.9 sec;   INR: 1.04 ratio         PTT - ( 17 Aug 2023 18:35 )  PTT:33.2 sec  Urinalysis Basic - ( 17 Aug 2023 18:35 )    Color: x / Appearance: x / SG: x / pH: x  Gluc: 290 mg/dL / Ketone: x  / Bili: x / Urobili: x   Blood: x / Protein: x / Nitrite: x   Leuk Esterase: x / RBC: x / WBC x   Sq Epi: x / Non Sq Epi: x / Bacteria: x            RADIOLOGY, EKG & ADDITIONAL TESTS: Reviewed.

## 2023-08-18 NOTE — H&P ADULT - ATTENDING COMMENTS
Pt was seen and examined during key portion of E/M service. Case discussed with resident. H&P reviewed and edited where appropriate. Other than the following, I agree with the above history, exam, assessment, and plan.  89M with insulin dependent DM2, HTN, HLD, recent hospitalization @ OSH for presyncopal episode and possible Mobitz type 1 heart block, found to have mild bradycardia and 1st degree heart block on EKG here, currently asymptomatic.    - f/u EP recs. tele. currently NPO for poss PPM. orthostats. trend Cr.

## 2023-08-18 NOTE — DISCHARGE NOTE PROVIDER - NSFOLLOWUPCLINICS_GEN_ALL_ED_FT
Cardiology at [facility]  Cardiology  [address] [place] [address]  Phone: [phone]  Fax:

## 2023-08-18 NOTE — H&P ADULT - PROBLEM SELECTOR PLAN 1
Patient with two episodes of syncope, found to have Mobitz 1 heart block at [place]. EKG at [facility] with 1st degree AV block.    - cardiology following, appreciate recs  - tele monitoring  - TSH  - potential PPM placement by cardiology Patient with two episodes of syncope, found to have Mobitz 1 heart block at [place]. EKG at [facility] with 1st degree AV block.  Would like to rule out other causes of syncope, including orthostatic hypotension. No valvular disease commented from TTE on records from [place].   - cardiology following, appreciate recs  - tele monitoring  - obtain TTE report from [place]  - TSH  - B12  - folate  - orthostatic vitals  - potential PPM placement by cardiology Patient with one episode of pre-syncope leading to hospitalization at [place]. Presyncope may have been due to symptomatic bradycardia at the time, other causes include dehydration, vasovagal episode, separate arrythmia, or less likely infection. No valvular disease commented from TTE on records from [place].  - EP following, appreciate recs  - Tele monitoring  - obtain TTE & EKG report from [place]  - TSH  - B12  - folate  - orthostatic vitals  - PT eval Patient with one episode of pre-syncope leading to hospitalization at [place]. Presyncope may have been due to symptomatic bradycardia at the time, other causes include dehydration, vasovagal episode, separate arrythmia, or less likely infection. No valvular disease commented from TTE on records from [place].  - EP following, appreciate recs  - Tele monitoring  - obtain TTE & EKG report from [place]  - TSH  - B12  - folate  - orthostatic vitals  - hydration with 500cc LR at 50cc/hr  - PT eval

## 2023-08-18 NOTE — PROGRESS NOTE ADULT - ASSESSMENT
89 M with insulin dependent DM2, HTN, HLD, recent hospitalization @ OSH for presyncopal episode and possible Mobitz type 1 heart block, found to have mild bradycardia and 1st degree heart block on EKG here, currently asymptomatic. Awaiting potential PPM placement.

## 2023-08-18 NOTE — CHART NOTE - NSCHARTNOTEFT_GEN_A_CORE
Pt s/p pacemaker placement ([employer]) [id number] via LCW with DSD and tegaderm intact with no hematoma bleeding       Plan:  Recovery on tele until 6-7pm  portable CXR done Dr. Mcmahon at bedside read Xray when completed with no PTX and Lead tip placement in correct position  started Metoprolol succ 50 qd  okay to d/c home 6-7pm as per EP    f/u appointment for wound check on 9/6/23 at 3:40pm  TTE d/c'd as d/w with Dr. Vizcarra.  Device interrogated and home monitoring device paired and explained to pt.  Discussed above with NAOMI LeCNP-BC  Cardiology

## 2023-08-18 NOTE — DISCHARGE NOTE NURSING/CASE MANAGEMENT/SOCIAL WORK - PATIENT PORTAL LINK FT
You can access the FollowMyHealth Patient Portal offered by [facility] by registering at the following website: [email or web address]. By joining [employer]’s FollowMyHealth portal, you will also be able to view your health information using other applications (apps) compatible with our system.

## 2023-08-18 NOTE — H&P ADULT - NSHPPHYSICALEXAM_GEN_ALL_CORE
Gen: well appearing, in no acute distress  Head: Normocephalic, atraumatic  ENT: MMM  CV: RRR, no m/r/g appreciated  Pulm: CTAB, no accessory muscle use  Abd: Nontender, non distended, normoactive bowel sounds  Extremities: no pitting edema in BLE  Neuro: CN II-XII grossly intact  Psych: Aox3, appropriate mood and affect T(C): 36.5 (08-17-23 @ 22:42), Max: 36.8 (08-17-23 @ 20:12)  T(F): 97.7 (08-17-23 @ 22:42), Max: 98.2 (08-17-23 @ 20:12)  HR: 55 (08-18-23 @ 03:10) (55 - 67)  BP: 150/84 (08-18-23 @ 03:10) (150/84 - 156/72)  RR: 18 (08-18-23 @ 03:10) (17 - 18)  SpO2: 98% (08-18-23 @ 03:10) (97% - 99%)      Gen: well appearing, in no acute distress  Head: Normocephalic, atraumatic  ENT: MMM  CV: RRR, no m/r/g appreciated  Pulm: CTAB, no accessory muscle use  Abd: Nontender, non distended, normoactive bowel sounds  Extremities: no pitting edema in BLE  Neuro: CN II-XII grossly intact  Psych: Aox3, appropriate mood and affect

## 2023-08-18 NOTE — PATIENT PROFILE ADULT - FALL HARM RISK - HARM RISK INTERVENTIONS

## 2023-08-18 NOTE — PROGRESS NOTE ADULT - PROBLEM SELECTOR PLAN 1
Patient with one episode of pre-syncope leading to hospitalization at [place]. Presyncope may have been due to symptomatic bradycardia at the time, other causes include dehydration, vasovagal episode, separate arrythmia, or less likely infection. No valvular disease commented from TTE on records from [place].  - EP following, appreciate recs  - Tele monitoring  - obtain TTE & EKG report from [place]: sent HIPPA release form 8/18  - TSH 8/18: 4.35 wnl  - B12  - folate  - orthostatic vitals  - hydration with 500cc LR at 50cc/hr  - PT eval Patient with one episode of pre-syncope leading to hospitalization at [place]. Presyncope may have been due to symptomatic bradycardia at the time, other causes include dehydration, vasovagal episode, separate arrythmia, or less likely infection. No valvular disease commented from TTE on records from [place].  - EP following, appreciate recs  - Tele monitoring  - obtain TTE & EKG report from [place]: sent HIPPA release form 8/18  - TSH 8/18: 4.35 wnl  - B12  - folate  - orthostatic vitals  - hydration with 500cc LR at 50cc/hr  - PT eval  - of note pt was trying betel nut (stimulant) for the first time during dizziness episode

## 2023-08-18 NOTE — H&P ADULT - PROBLEM SELECTOR PLAN 4
Continue home valsartan 40mg. Patient's Cr is 1.35, unclear if this is his baseline.   -hold home valsartan 40mg for now given creatinine Patient's Cr is 1.35, unclear if this is his baseline.   -hold home valsartan 40mg for now given creatinine  -continue home amlodipine 5mg Patient's Cr is 1.35, unclear if this is his baseline.   -hold home valsartan 40mg for now given mild creatinine elevation  -continue home amlodipine 5mg

## 2023-08-18 NOTE — PROVIDER CONTACT NOTE (OTHER) - SITUATION
HR dropped to 43 on cardiac monitor, notified by tele tech
2.2 second pause HR 39 on cardiac monitor, notified by tele tech

## 2023-08-18 NOTE — H&P ADULT - PROBLEM SELECTOR PLAN 3
Insulin dependent. Takes *** at home. Insulin dependent. Discharged from [place] with 25U lantus. Previously also took novolog 20 at home, but was d/c'd at [place]. Does not take meal time insulin.   - Continue 25 U lantus   - KASIA Insulin dependent. Discharged from [place] with 25U lantus, which is his home regimen. Previously also took novolog 20 at home, but was d/c'd at [place]. Does not take meal time insulin.   - 20 U lantus   - KASIA

## 2023-08-18 NOTE — DISCHARGE NOTE PROVIDER - HOSPITAL COURSE
89 M with insulin dependent DM2, HTN, HLD, recent hospitalization @ OSH for presyncopal episode and possible Mobitz type 1 heart block, found to have mild bradycardia and 1st degree heart block on EKG here. EP c/s and rec'd PPM placement done 8/18.     Patient is now hemodynamically stable and medically optimized for discharge home with referrals to appropriate clinics. 89 M with insulin dependent DM2, HTN, HLD, recent hospitalization @ OSH for presyncopal episode and possible Mobitz type 1 heart block, found to have mild bradycardia and 1st degree heart block on EKG here. EP c/s and rec'd PPM placement done 8/18. Of note pt was using betel nut for the first time during pre-syncopal episode; counselled to stop using.     Patient is now hemodynamically stable and medically optimized for discharge home with referrals to appropriate clinics. 89 M with insulin dependent DM2, HTN, HLD, recent hospitalization @ OSH for presyncopal episode and possible Mobitz type 1 heart block, found to have mild bradycardia and 1st degree heart block on EKG here. EP c/s and rec'd PPM placement done 8/18. Pt also dc'd on metoprolol succinated 50mg daily.     Of note pt was using betel nut for the first time during pre-syncopal episode; counselled to stop using.     Patient is now hemodynamically stable and medically optimized for discharge home with referrals to appropriate clinics.

## 2023-08-18 NOTE — H&P ADULT - PROBLEM SELECTOR PLAN 2
Found to have Mobitz 1 heart block at [place], transferred for pacemaker placement.   - cardiology following appreciate recs  - tele monitoring as above  - potential PPM placement by cardiology as above. Found to have Mobitz 1 heart block at [place], transferred for pacemaker placement. Here, seen to have type I AV block  - cardiology following appreciate recs  - tele monitoring as above  - potential PPM placement by cardiology as above. Found to have questionable Wenkebach at [place], but no EKG to confirm. Here, seen to have first degree AV block, EKG in 2011 also with first degree AV block. On tele pt was in 50s and above, briefly in 40s while sleeping. Not on any AV richard blockers.   - f/u EP recs   - tele monitoring as above kaylie to 50s. Briefly to HR 40s overnight.  Found to have questionable Wenkebach at [place], but no EKG to confirm. Here, seen to have first degree AV block, EKG in 2011 also with first degree AV block. On tele pt was in 50s and above, briefly in 40s while sleeping. Not on any AV richard blockers.   - f/u EP recs   - tele monitoring as above

## 2023-08-18 NOTE — DISCHARGE NOTE PROVIDER - NSDCMRMEDTOKEN_GEN_ALL_CORE_FT
amLODIPine 5 mg oral tablet: 1 orally once a day  atorvastatin 20 mg oral tablet: 1 orally once a day  Lantus 100 units/mL subcutaneous solution: 25 unit(s) subcutaneous once a day (at bedtime)  valsartan 40 mg oral tablet: 1 orally once a day   amLODIPine 5 mg oral tablet: 1 orally once a day  atorvastatin 20 mg oral tablet: 1 orally once a day  Lantus 100 units/mL subcutaneous solution: 25 unit(s) subcutaneous once a day (at bedtime)  metoprolol succinate 50 mg oral tablet, extended release: 1 tab(s) orally once a day  valsartan 40 mg oral tablet: 1 orally once a day

## 2023-09-06 ENCOUNTER — RESULT CHARGE (OUTPATIENT)
Age: 88
End: 2023-09-06

## 2023-09-07 ENCOUNTER — NON-APPOINTMENT (OUTPATIENT)
Age: 88
End: 2023-09-07

## 2023-09-07 ENCOUNTER — APPOINTMENT (OUTPATIENT)
Dept: ELECTROPHYSIOLOGY | Facility: CLINIC | Age: 88
End: 2023-09-07
Payer: MEDICARE

## 2023-09-07 VITALS — OXYGEN SATURATION: 98 % | SYSTOLIC BLOOD PRESSURE: 135 MMHG | HEART RATE: 77 BPM | DIASTOLIC BLOOD PRESSURE: 73 MMHG

## 2023-09-07 DIAGNOSIS — I44.0 ATRIOVENTRICULAR BLOCK, FIRST DEGREE: ICD-10-CM

## 2023-09-07 DIAGNOSIS — I44.1 ATRIOVENTRICULAR BLOCK, SECOND DEGREE: ICD-10-CM

## 2023-09-07 DIAGNOSIS — R55 SYNCOPE AND COLLAPSE: ICD-10-CM

## 2023-09-07 PROCEDURE — 99024 POSTOP FOLLOW-UP VISIT: CPT

## 2023-09-07 PROCEDURE — 93000 ELECTROCARDIOGRAM COMPLETE: CPT | Mod: 59

## 2023-09-07 PROCEDURE — 93280 PM DEVICE PROGR EVAL DUAL: CPT

## 2023-09-07 RX ORDER — METOPROLOL SUCCINATE 50 MG/1
50 TABLET, EXTENDED RELEASE ORAL DAILY
Qty: 90 | Refills: 1 | Status: ACTIVE | COMMUNITY
Start: 2023-09-07 | End: 1900-01-01

## 2023-09-08 PROCEDURE — 33208 INSRT HEART PM ATRIAL & VENT: CPT

## 2023-09-08 RX ORDER — AMLODIPINE BESYLATE 2.5 MG/1
1 TABLET ORAL
Refills: 0 | DISCHARGE

## 2023-09-08 RX ORDER — INSULIN GLARGINE 100 [IU]/ML
25 INJECTION, SOLUTION SUBCUTANEOUS
Refills: 0 | DISCHARGE

## 2023-09-08 RX ORDER — VALSARTAN 80 MG/1
1 TABLET ORAL
Refills: 0 | DISCHARGE

## 2023-09-08 RX ORDER — ATORVASTATIN CALCIUM 80 MG/1
1 TABLET, FILM COATED ORAL
Refills: 0 | DISCHARGE

## 2023-12-08 ENCOUNTER — APPOINTMENT (OUTPATIENT)
Dept: ELECTROPHYSIOLOGY | Facility: CLINIC | Age: 88
End: 2023-12-08

## 2024-01-16 ENCOUNTER — APPOINTMENT (OUTPATIENT)
Dept: ELECTROPHYSIOLOGY | Facility: CLINIC | Age: 89
End: 2024-01-16
Payer: MEDICARE

## 2024-01-16 ENCOUNTER — NON-APPOINTMENT (OUTPATIENT)
Age: 89
End: 2024-01-16

## 2024-01-16 PROCEDURE — 93294 REM INTERROG EVL PM/LDLS PM: CPT

## 2024-01-16 PROCEDURE — 93296 REM INTERROG EVL PM/IDS: CPT

## 2024-02-08 DIAGNOSIS — I48.91 UNSPECIFIED ATRIAL FIBRILLATION: ICD-10-CM

## 2024-02-08 RX ORDER — APIXABAN 2.5 MG/1
2.5 TABLET, FILM COATED ORAL
Qty: 180 | Refills: 1 | Status: ACTIVE | COMMUNITY
Start: 2024-02-08 | End: 1900-01-01

## 2024-04-03 ENCOUNTER — RESULT CHARGE (OUTPATIENT)
Age: 89
End: 2024-04-03

## 2024-04-04 ENCOUNTER — NON-APPOINTMENT (OUTPATIENT)
Age: 89
End: 2024-04-04

## 2024-04-04 ENCOUNTER — APPOINTMENT (OUTPATIENT)
Dept: ELECTROPHYSIOLOGY | Facility: CLINIC | Age: 89
End: 2024-04-04
Payer: MEDICARE

## 2024-04-04 VITALS
HEART RATE: 87 BPM | WEIGHT: 161 LBS | BODY MASS INDEX: 23.78 KG/M2 | DIASTOLIC BLOOD PRESSURE: 75 MMHG | OXYGEN SATURATION: 99 % | SYSTOLIC BLOOD PRESSURE: 115 MMHG

## 2024-04-04 DIAGNOSIS — Z95.0 PRESENCE OF CARDIAC PACEMAKER: ICD-10-CM

## 2024-04-04 PROCEDURE — 93000 ELECTROCARDIOGRAM COMPLETE: CPT | Mod: 59

## 2024-04-04 PROCEDURE — 93280 PM DEVICE PROGR EVAL DUAL: CPT

## 2024-06-13 ENCOUNTER — OFFICE (OUTPATIENT)
Facility: LOCATION | Age: 89
Setting detail: OPHTHALMOLOGY
End: 2024-06-13
Payer: COMMERCIAL

## 2024-06-13 DIAGNOSIS — H25.12: ICD-10-CM

## 2024-06-13 DIAGNOSIS — H43.393: ICD-10-CM

## 2024-06-13 PROBLEM — E11.9 TYPE 2 DIABETES MELLITUS WITHOUT COMPLICATIONS: Status: ACTIVE | Noted: 2024-06-13

## 2024-06-13 PROBLEM — H26.491 POSTERIOR CAPSULAR OPACIFICATION; RIGHT EYE: Status: ACTIVE | Noted: 2024-06-13

## 2024-06-13 PROCEDURE — 99213 OFFICE O/P EST LOW 20 MIN: CPT | Performed by: OPHTHALMOLOGY

## 2024-06-28 ENCOUNTER — APPOINTMENT (OUTPATIENT)
Dept: ELECTROPHYSIOLOGY | Facility: CLINIC | Age: 89
End: 2024-06-28

## 2024-07-05 ENCOUNTER — APPOINTMENT (OUTPATIENT)
Dept: ELECTROPHYSIOLOGY | Facility: CLINIC | Age: 89
End: 2024-07-05

## 2024-08-09 ENCOUNTER — NON-APPOINTMENT (OUTPATIENT)
Age: 89
End: 2024-08-09

## 2024-08-09 ENCOUNTER — APPOINTMENT (OUTPATIENT)
Dept: ELECTROPHYSIOLOGY | Facility: CLINIC | Age: 89
End: 2024-08-09

## 2024-08-09 PROCEDURE — 93294 REM INTERROG EVL PM/LDLS PM: CPT

## 2024-08-09 PROCEDURE — 93296 REM INTERROG EVL PM/IDS: CPT

## 2024-10-08 NOTE — ED ADULT NURSE NOTE - LANGUAGE ASSISTANCE NEEDED
family translating in triage/Yes-Patient/Caregiver accepts free interpretation services... Detail Level: Generalized Render Risk Assessment In Note?: no Additional Notes: Family history of MM mother and daughter

## 2024-11-12 ENCOUNTER — APPOINTMENT (OUTPATIENT)
Dept: ELECTROPHYSIOLOGY | Facility: CLINIC | Age: 89
End: 2024-11-12
Payer: MEDICARE

## 2024-11-12 ENCOUNTER — NON-APPOINTMENT (OUTPATIENT)
Age: 89
End: 2024-11-12

## 2024-11-12 PROCEDURE — 93296 REM INTERROG EVL PM/IDS: CPT

## 2024-11-12 PROCEDURE — 93294 REM INTERROG EVL PM/LDLS PM: CPT

## 2025-02-12 ENCOUNTER — APPOINTMENT (OUTPATIENT)
Dept: ELECTROPHYSIOLOGY | Facility: CLINIC | Age: 89
End: 2025-02-12

## 2025-03-17 ENCOUNTER — APPOINTMENT (OUTPATIENT)
Dept: ELECTROPHYSIOLOGY | Facility: CLINIC | Age: 89
End: 2025-03-17

## 2025-04-04 ENCOUNTER — APPOINTMENT (OUTPATIENT)
Dept: ELECTROPHYSIOLOGY | Facility: CLINIC | Age: 89
End: 2025-04-04

## 2025-04-15 ENCOUNTER — APPOINTMENT (OUTPATIENT)
Dept: ELECTROPHYSIOLOGY | Facility: CLINIC | Age: 89
End: 2025-04-15

## 2025-07-11 ENCOUNTER — RESULT CHARGE (OUTPATIENT)
Age: 89
End: 2025-07-11

## 2025-07-11 ENCOUNTER — APPOINTMENT (OUTPATIENT)
Dept: ELECTROPHYSIOLOGY | Facility: CLINIC | Age: 89
End: 2025-07-11

## 2025-07-11 VITALS — SYSTOLIC BLOOD PRESSURE: 148 MMHG | HEART RATE: 84 BPM | DIASTOLIC BLOOD PRESSURE: 88 MMHG | OXYGEN SATURATION: 98 %

## 2025-07-11 PROCEDURE — 93000 ELECTROCARDIOGRAM COMPLETE: CPT | Mod: XU

## 2025-07-11 PROCEDURE — 93280 PM DEVICE PROGR EVAL DUAL: CPT

## 2025-08-19 ENCOUNTER — OFFICE (OUTPATIENT)
Facility: LOCATION | Age: OVER 89
Setting detail: OPHTHALMOLOGY
End: 2025-08-19
Payer: COMMERCIAL

## 2025-08-19 DIAGNOSIS — H43.813: ICD-10-CM

## 2025-08-19 DIAGNOSIS — H25.12: ICD-10-CM

## 2025-08-19 DIAGNOSIS — H26.491: ICD-10-CM

## 2025-08-19 PROCEDURE — 92202 OPSCPY EXTND ON/MAC DRAW: CPT | Performed by: OPTOMETRIST

## 2025-08-19 PROCEDURE — 92134 CPTRZ OPH DX IMG PST SGM RTA: CPT | Performed by: OPTOMETRIST

## 2025-08-19 PROCEDURE — 92014 COMPRE OPH EXAM EST PT 1/>: CPT | Performed by: OPTOMETRIST

## 2025-08-19 ASSESSMENT — REFRACTION_MANIFEST
OD_CYLINDER: -1.25
OD_AXIS: 082
OD_VA1: 20/20-1
OS_SPHERE: +0.25
OD_VA1: 20/25-1
OD_SPHERE: +0.50
OD_SPHERE: +3.25
OD_CYLINDER: -0.75
OD_VA1: 20/25-1
OS_CYLINDER: -1.25
OS_SPHERE: +3.25
OS_VA1: 20/50
OD_CYLINDER: -1.50
OS_AXIS: 090
OS_SPHERE: UNABLE
OD_AXIS: 090
OD_SPHERE: +0.75
OS_AXIS: 090
OS_VA1: 20/50
OS_CYLINDER: -1.25
OD_AXIS: 0

## 2025-08-19 ASSESSMENT — KERATOMETRY
OD_AXISANGLE_DEGREES: 173
OD_K1POWER_DIOPTERS: 43.00
OD_K2POWER_DIOPTERS: 44.25
OS_AXISANGLE_DEGREES: 146
METHOD_AUTO_MANUAL: AUTO
OS_K2POWER_DIOPTERS: 43.75
OS_K1POWER_DIOPTERS: 43.25

## 2025-08-19 ASSESSMENT — REFRACTION_AUTOREFRACTION
OD_CYLINDER: -1.50
OD_SPHERE: +0.75
OD_AXIS: 082
OS_SPHERE: UNABLE

## 2025-08-19 ASSESSMENT — TONOMETRY
OS_IOP_MMHG: 16
OD_IOP_MMHG: 14

## 2025-08-19 ASSESSMENT — VISUAL ACUITY
OD_BCVA: 20/40+1
OS_BCVA: 20/30+2

## 2025-08-19 ASSESSMENT — CONFRONTATIONAL VISUAL FIELD TEST (CVF)
OD_FINDINGS: FULL
OS_FINDINGS: FULL